# Patient Record
Sex: FEMALE | Race: WHITE | URBAN - METROPOLITAN AREA
[De-identification: names, ages, dates, MRNs, and addresses within clinical notes are randomized per-mention and may not be internally consistent; named-entity substitution may affect disease eponyms.]

---

## 2019-11-22 ENCOUNTER — OFFICE VISIT (OUTPATIENT)
Dept: URGENT CARE | Age: 79
End: 2019-11-22

## 2019-11-22 VITALS
RESPIRATION RATE: 16 BRPM | HEART RATE: 88 BPM | OXYGEN SATURATION: 97 % | SYSTOLIC BLOOD PRESSURE: 133 MMHG | TEMPERATURE: 98 F | DIASTOLIC BLOOD PRESSURE: 73 MMHG

## 2019-11-22 DIAGNOSIS — N39.0 URINARY TRACT INFECTION WITHOUT HEMATURIA, SITE UNSPECIFIED: ICD-10-CM

## 2019-11-22 DIAGNOSIS — R30.0 DYSURIA: Primary | ICD-10-CM

## 2019-11-22 LAB
APPEARANCE, POC: NORMAL
BILIRUBIN, POC: NEGATIVE
COLOR, POC: YELLOW
GLUCOSE UR-MCNC: NEGATIVE MG/DL
KETONES, POC: NEGATIVE
NITRITE, POC: NEGATIVE
OCCULT BLOOD, POC: NORMAL
PH UR: 7 [PH] (ref 5–7)
PROT UR-MCNC: NORMAL G/DL
SP GR UR: 1.02 (ref 1–1.03)
UROBILINOGEN UR-MCNC: 0.2 MG/DL (ref 0–1)
WBC (LEUKOCYTE) ESTERASE, POC: NORMAL

## 2019-11-22 PROCEDURE — 99203 OFFICE O/P NEW LOW 30 MIN: CPT | Performed by: PHYSICIAN ASSISTANT

## 2019-11-22 PROCEDURE — 81003 URINALYSIS AUTO W/O SCOPE: CPT | Performed by: PHYSICIAN ASSISTANT

## 2019-11-22 RX ORDER — SULFAMETHOXAZOLE AND TRIMETHOPRIM 800; 160 MG/1; MG/1
1 TABLET ORAL 2 TIMES DAILY
Qty: 20 TABLET | Refills: 0 | Status: SHIPPED | OUTPATIENT
Start: 2019-11-22 | End: 2019-12-02

## 2019-11-24 LAB
BACTERIA UR CULT: ABNORMAL
ORGANISM: ABNORMAL
REPORT STATUS (RPT): ABNORMAL
SPECIMEN SOURCE: ABNORMAL

## 2019-11-25 ENCOUNTER — TELEPHONE (OUTPATIENT)
Dept: URGENT CARE | Age: 79
End: 2019-11-25

## 2023-12-01 ENCOUNTER — APPOINTMENT (OUTPATIENT)
Dept: URBAN - METROPOLITAN AREA CLINIC 195 | Age: 83
Setting detail: DERMATOLOGY
End: 2023-12-04

## 2023-12-01 VITALS
HEIGHT: 61 IN | SYSTOLIC BLOOD PRESSURE: 156 MMHG | WEIGHT: 125 LBS | TEMPERATURE: 98 F | DIASTOLIC BLOOD PRESSURE: 78 MMHG | RESPIRATION RATE: 16 BRPM | HEART RATE: 78 BPM

## 2023-12-01 DIAGNOSIS — Z12.83 ENCOUNTER FOR SCREENING FOR MALIGNANT NEOPLASM OF SKIN: ICD-10-CM

## 2023-12-01 DIAGNOSIS — L57.8 OTHER SKIN CHANGES DUE TO CHRONIC EXPOSURE TO NONIONIZING RADIATION: ICD-10-CM

## 2023-12-01 PROBLEM — D04.39 CARCINOMA IN SITU OF SKIN OF OTHER PARTS OF FACE: Status: ACTIVE | Noted: 2023-12-01

## 2023-12-01 PROBLEM — C44.329 SQUAMOUS CELL CARCINOMA OF SKIN OF OTHER PARTS OF FACE: Status: ACTIVE | Noted: 2023-12-01

## 2023-12-01 PROCEDURE — OTHER COUNSELING: OTHER

## 2023-12-01 PROCEDURE — 17282 DSTR MAL LS F/E/E/N/L/M1.1-2: CPT | Mod: 59

## 2023-12-01 PROCEDURE — OTHER SURGICAL DECISION MAKING: OTHER

## 2023-12-01 PROCEDURE — OTHER MOHS SURGERY: OTHER

## 2023-12-01 PROCEDURE — 13132 CMPLX RPR F/C/C/M/N/AX/G/H/F: CPT | Mod: 59

## 2023-12-01 PROCEDURE — OTHER DESTRUCTION WITH CO2 LASER: OTHER

## 2023-12-01 PROCEDURE — 99203 OFFICE O/P NEW LOW 30 MIN: CPT | Mod: 25

## 2023-12-01 PROCEDURE — OTHER MALIGNANT DESTRUCTION WITH CO2 LASER: OTHER

## 2023-12-01 PROCEDURE — 17311 MOHS 1 STAGE H/N/HF/G: CPT

## 2023-12-01 PROCEDURE — OTHER MIPS QUALITY: OTHER

## 2023-12-01 NOTE — PROCEDURE: DESTRUCTION WITH CO2 LASER
Body Location Override (Optional - Billing Will Still Be Based On Selected Body Map Location If Applicable): right medial cheek
Detail Level: Detailed
Size Of Lesion In Cm (Required): 2
X Size Of Lesion In Cm (Optional): 0
Was Feathering Performed?: No
Anesthesia Type: 1% lidocaine with 1:100,000 epinephrine and 408mcg clindamycin/ml and a 1:10 solution of 8.4% sodium bicarbonate
External Cooling Fan Speed: 5
Laser Type: CO2 laser
Post-Care Statement: Following the procedure, vaseline and a bandage were applied.
Consent: Written consent obtained, risks reviewed including but not limited to crusting, scabbing, blistering, scarring, darker or lighter pigmentary change, incomplete improvement, infection and bleeding.
Post-Care Instructions: I reviewed with the patient in detail post-care instructions. Patient should avoid sun until the area is fully healed. Pt should apply vaseline to treated areas.
Bill As A Line Item Or As Units: Line Item

## 2023-12-01 NOTE — PROCEDURE: MOHS SURGERY
Body Location Override (Optional - Billing Will Still Be Based On Selected Body Map Location If Applicable): right medial cheek
Referred To Mid-Level For Closure Text (Leave Blank If You Do Not Want): After obtaining clear surgical margins the patient was sent to a mid-level provider for surgical repair.  The patient understands they will receive post-surgical care and follow-up from the mid-level provider.
Bcc Superficial Histology Text: There were aggregates of basaloid cells budding from the epidermis.
Plastic Surgeon Procedure Text (B): After obtaining clear surgical margins the patient was sent to plastics for surgical repair.  The patient understands they will receive post-surgical care and follow-up from the referring physician's office.
Epidermal Closure: running
Bilateral Helical Rim Advancement Flap Text: Because of the tightness of the surrounding skin, the full-thickness nature of the defect with exposed cartilage, and to avoid deformity, a helical rim advancement flap was planed.  After prep and anesthesia, an incision was made in the anterior portion of the ear through the skin and the cartilage to the posterior perichondrium both superiorly and inferiorly within the scapha of the ear.  Inferiorly, this extended to the lobule where a Burow’s triangle was excised anteriorly.  The chondrocutaneous flaps were then  from the ear posteriorly at the level of the perichondrium to the postauricular sulcus.  A small rim of cartilage was also excised around the contour of the ear both superiorly and inferiorly, and after hemostasis obtained, the chondrocutaneous flaps were advanced and closed in a layered fashion
O-L Flap Text: Because of the full-thickness nature of the defect, and to preserve nearby structures and landmarks, a Burow’s advancement flap (L-plasty) was planned. After prep and anesthesia, a Burow's triangle was excised adjacent to the defect.  Perpendicular to this, a line was incised and crescentic Burow’s triangle excised.  The flap was elevated by undermining in the subcutaneous plane. Hemostasis was obtained.  The flap was advanced into the defect with care to avoid distortion and was sutured into place in a layered fashion
Quadrants Reporting?: 0
Render Postcare In The Note: No
Show Mohs Case Number Variable: Yes
Estlander Flap (Upper To Lower Lip) Text: The defect of the lower lip was assessed and measured.  Given the location and size of the defect, an Estlander flap was deemed most appropriate.  Using a sterile surgical marker, an appropriate Estlander flap was measured and drawn on the upper lip. Local anesthesia was then infiltrated. A scalpel was then used to incise the lateral aspect of the flap, through skin, muscle and mucosa, leaving the flap pedicled medially.  The flap was then rotated and positioned to fill the lower lip defect.  The flap was then sutured into place with a three layer technique, closing the orbicularis oris muscle layer with subcutaneous buried sutures, followed by a mucosal layer and an epidermal layer.
Consent (Ear)/Introductory Paragraph: The rationale for Mohs was explained to the patient and consent was obtained. The risks, benefits and alternatives to therapy were discussed in detail. Specifically, the risks of ear deformity, infection, scarring, bleeding, prolonged wound healing, incomplete removal, allergy to anesthesia, nerve injury and recurrence were addressed. Prior to the procedure, the treatment site was clearly identified and confirmed by the patient. All components of Universal Protocol/PAUSE Rule completed.
Special Stains Stage 6 - Results: Base On Clearance Noted Above
Consent (Near Eyelid Margin)/Introductory Paragraph: The rationale for Mohs was explained to the patient and consent was obtained. The risks, benefits and alternatives to therapy were discussed in detail. Specifically, the risks of ectropion or eyelid deformity, infection, scarring, bleeding, prolonged wound healing, incomplete removal, allergy to anesthesia, nerve injury and recurrence were addressed. Prior to the procedure, the treatment site was clearly identified and confirmed by the patient. All components of Universal Protocol/PAUSE Rule completed.
Asc Procedure Text (C): After obtaining clear surgical margins the patient was sent to an ASC for surgical repair.  The patient understands they will receive post-surgical care and follow-up from the ASC physician.
Bcc Pigmented Histology Text: There were aggregates of basaloid cells.
Cheiloplasty (Less Than 50%) Text: In order to maintain form and function of the lip, and to avoid distortion of the free margin, a lip advancement flap was planned. After rep and local anesthesia, Burow’s triangles were excised superiorly to the nasal sill and inferiorly around the lip to the labial mucosa.  Two flaps were elevated by undermining the skin laterally in both directions,  the skin and mucosa from the orbicularis muscle.  Any redundant orbicularis oris muscle was removed and complimentary edges of remaining muscle reapposed with a horizontal mattress stitch.  After hemostasis was obtained, the flaps were advanced and closed in a layered fashion.
Acral Melanoma Histology Text: Proliferation of MART-1+ cells.
Full Thickness Lip Wedge Repair (Flap) Text: In order to maintain form and function of the lip, and to avoid distortion of the free margin, a lip advancement flap was planned. After prep and local anesthesia, Burow’s triangles were excised superiorly to the nasal sill and inferiorly around the lip to the labial mucosa.  Two flaps were elevated by undermining the skin laterally in both directions,  the skin and mucosa from the orbicularis muscle.  Any redundant orbicularis oris muscle was removed and complimentary edges of remaining muscle reapposed with a horizontal mattress stitch.  After hemostasis was obtained, the flaps were advanced and closed in a layered fashion.
Peng Advancement Flap Text: Because of the full-thickness nature of the defect and location adjacent to free margin of alar rims, and to maintain functional airway and alar rim position, a bilateral advancement flap was planned. After prep and anesthesia, incisions were extended from the opposite side of the wound along the alar grooves, and Burow’s triangles at the end of each incision were excised.  Two flaps were created by undermining in the subcutaneous plane skeletonizing the nasal cartilages. After hemostasis was obtained, the flaps were advanced and sutured in a layered fashion.
Unique Flap 2 Text: Because of the full-thickness nature of the defect and to reduce risk for alar rim retraction, and after discussion of options and risk for alar retraction with this repair, a free cartilage graft was planned.  An incision through the anterior antihelix was made and the skin lifted in the periochondrial plane.  A square of cartilage sized to fit defect.
Unique Flap 1 Name: Nasal Advancement Flap
Hemigard Intro: Due to skin fragility and wound tension, it was decided to use HEMIGARD adhesive retention suture devices to permit a linear closure. The skin was cleaned and dried for a 6cm distance away from the wound. Excessive hair, if present, was removed to allow for adhesion.
Purse String (Simple) Text: In order to control the direction of wound closure, to prevent distortion from wound contraction, and to reduce wound size to facilitate wound care and healing, an intermediate repair was planned.  After prep and anesthesia, and circumferential undermining, subcutaneous and dermal stitches were carefully placed across the wound.
Localized Dermabrasion With Wire Brush Text: First, a scalpel was used to shave remove protuberant scar and sebaceous hyperplasia.  Next, sterilized sandpaper was used to physically abrade to papillary dermis. This spot dermabrasion was performed to complete skin cancer reconstruction. It also will eliminate the other sun damaged precancerous cells that are known to be part of the regional effect of a lifetime's worth of sun exposure. This localized dermabrasion is therapeutic and should not be considered cosmetic in any regard.
Dressing: pressure dressing with telfa
Provider Procedure Text (C): After obtaining clear surgical margins the defect was repaired by another provider.
Epidermal Autograft Text: Because of the location and depth of the defect and to facilitate healing, an epidermal autograft was deemed most appropriate.  The epidermal-dermal pinch grafts were then harvested.  The skin grafts were then placed in the primary defect and oriented appropriately. The grafts were secured with vaseline gauze and bandage.
Double O-Z Flap Text: Because of the full-thickness nature of the defect and location adjacent to important structure(s), a bilateral rotation flap (O to T) was planned. After prep and anesthesia, arcuate incisions were extended from two opposite side of the wound.  Two flaps were created by undermining in the subcutaneous plane. After hemostasis was obtained, the flaps were advanced and sutured in a layered fashion.
Oculoplastic Surgeon Procedure Text (E): After obtaining clear surgical margins the patient was sent to oculoplastics for surgical repair.  The patient understands they will receive post-surgical care and follow-up from the referring physician's office.
Island Pedicle Flap Text: In order to avoid distortion of nearby structures, an island pedicle flap was planned.  After prep and local anesthesia, a triangular incision was made.  The flap was dissected to a muscular subcutaneous pedicle.  The skin surrounding the flap was undermined to allow for closure of the donor-site defect. After hemostasis obtained, the flap was advanced into place and sutured in a layered fashion.
Stage 10: Additional Anesthesia Type: 1% lidocaine with 1:100,000 epinephrine and 408mcg clindamycin/ml and a 1:10 solution of 8.4% sodium bicarbonate
Metatypical Bcc Histology Text: There were aggregates of basaloid cells in a metatypical pattern.
Alternatives Discussed Intro (Do Not Add Period): I discussed alternative treatments to Mohs surgery and specifically discussed the risks and benefits of
Island Pedicle Flap-Requiring Vessel Identification Text: Due to the location on free margin and to restore and maintain airway, an alar IPF was planned.  Given the location of the defect, shape of the defect and the proximity to free margins an island pedicle advancement flap was deemed most appropriate.  Using a sterile surgical marker, an appropriate advancement flap was drawn, based on the axial vessel mentioned above, incorporating the defect, outlining the appropriate donor tissue and placing the expected incisions within the relaxed skin tension lines where possible.    The area thus outlined was incised deep to adipose tissue with a #15 scalpel blade.  The skin margins were undermined to an appropriate distance in all directions around the primary defect and laterally outward around the island pedicle utilizing iris scissors.  There was minimal undermining beneath the pedicle flap.
Consent (Marginal Mandibular)/Introductory Paragraph: The rationale for Mohs was explained to the patient and consent was obtained. The risks, benefits and alternatives to therapy were discussed in detail. Specifically, the risks of damage to the marginal mandibular branch of the facial nerve, infection, scarring, bleeding, prolonged wound healing, incomplete removal, allergy to anesthesia, and recurrence were addressed. Prior to the procedure, the treatment site was clearly identified and confirmed by the patient. All components of Universal Protocol/PAUSE Rule completed.
Bcc  Morpheaform/Sclerosing Histology Text: There were aggregates of basaloid cells demonstrating a morpheaform/sclerosing pattern.
Anesthesia Volume In Cc: 2
Cheek-To-Nose Interpolation Flap Text: In order to maintain the form and function of the airway, and to maintain the alar groove, a two-stage interpolation axial flap and staged reconstruction was planned for closure.  A telfa template was made of the defect.  This was then used to outline the cheek interpolation flap.  The donor area for the pedicle flap was then anesthetized.  The flap was incised through the skin and subcutaneous tissue down to the layer of the underlying musculature.  The flap was carefully incised within the deep plane to maintain its blood supply. The pedicle was then rotated into position and sutured.  \\n\\nTo close the donor-site defect on the cheek, an advancement flap was created by wide undermining laterally in the subcutaneous plane. After hemostasis was obtained, this flap was advanced medially and sutured in a layered fashion.  The portion of the advancement flap near the pedicle of the interpolation flap was closed with care, so as not to constrict the vasculature of the pedicle.   \\n\\nOnce the flaps were sutured into place, adequate blood supply was confirmed with blanching and refill.  The pedicle was wrapped with xeroform gauze and dressed with telfa and gauze bandage to ensure continued blood supply and protect the attached pedicle.
Incidental Squamous Cell Carcinoma In Situ Histology Text: Incidental SCIS was noted at the periphery of the Mohs section and additional stages or destruction were performed until clear.
Dorsal Nasal Flap Text: Because of the full-thickness nature of the defect and to avoid deformity of free margin of the ala, and after full discussion of the spectrum of repair options, a dorsal nasal rotation flap was planned.  After prep and anesthesia, a Burow’s triangle was excised from the lateral portion superiorly on the nasal sidewall towards the inner canthus and an incision extended from the inferior margin of the wound across the nose and sidewall, then extended superiorly along the nasofacial sulcus and medial cheek through the inner canthus to the glabella where a back cut was made to the contralateral inner canthus.  The flap was elevated by skeletonizing the nasal root, dorsum, and sidewalls.  After hemostasis obtained, the flap was rotated into place, trimmed to fit the defect, and sutured in a layered fashion.
Non-Graft Cartilage Fenestration Text: The exposed cartilage was fenestrated with a 3mm punch biopsy to help facilitate wound healing, and decrease infection and chondritis.
Lazy S Complex Repair Preamble Text (Leave Blank If You Do Not Want): Extensive wide undermining was performed.
Mustarde Flap Text: Because of full-thickness of the defect and to avoid distortion of the lower eyelid and canthus, a Mustarde cheek rotation flap was planned. After prep and anesthesia, a large Burow’s triangle was excised inferiorly.  An incision was made from the superior portion of the wound over the orbital rim, curving upward onto the temple, then down toward the preauricular crease where another Burow’s triangle was excised.  The full cheek flap was elevated and the wound edges were undermined in the subcutaneous plane. After hemostasis, the flap was rotated into place with care to preserve lower lid position, trimmed at the tip, suspended with a periosteal stitch from the orbital rim, and sutured in a a layered fashion.
Consent (Lip)/Introductory Paragraph: The rationale for Mohs was explained to the patient and consent was obtained. The risks, benefits and alternatives to therapy were discussed in detail. Specifically, the risks of lip deformity, changes in the oral aperture, infection, scarring, bleeding, prolonged wound healing, incomplete removal, allergy to anesthesia, nerve injury and recurrence were addressed. Prior to the procedure, the treatment site was clearly identified and confirmed by the patient. All components of Universal Protocol/PAUSE Rule completed.
Stage 13: Additional Anesthesia Type: 1% lidocaine with epinephrine
Zygomaticofacial Flap Text: Because of full-thickness of the defect and to avoid distortion of the lower eyelid, a full cheek rotation flap was planned. After prep and anesthesia, a large Burow’s triangle was excised inferiorly.  An incision was made from the superior portion of the wound over the orbital rim, curving upward onto the temple, then downward along the preauricular crease and below the ear lobule where another Burow’s triangle was excised.  The full cheek flap was elevated and the wound edges were undermined in the subcutaneous plane. After hemostasis, the flap was rotated into place, trimmed at the tip, suspended with a periosteal stitch from the orbital rim, and sutured in a a layered fashion.
Nasal Turnover Hinge Flap Text: Due to the deep nature of the defect, and to restore structure and function, and to cover and ensure survival of underlying tissue, and to provide cutaneous coverage, a cutaneous hinge flap was performed.  Three sides of the full-thickness skin adjacent to the defect were incised and flipped over like a page of the book into the defect, and secured with Vicryl stitches.
Consent 2/Introductory Paragraph: Mohs surgery was explained to the patient and consent was obtained. The risks, benefits and alternatives to therapy were discussed in detail. Specifically, the risks of infection, scarring, bleeding, prolonged wound healing, incomplete removal, allergy to anesthesia, nerve injury and recurrence were addressed. Prior to the procedure, the treatment site was clearly identified and confirmed by the patient. All components of Universal Protocol/PAUSE Rule completed.
Perineural Invasion (For Histology - Be Specific If Possible): absent
Secondary Intention Text (Leave Blank If You Do Not Want): Due to the superficial nature of the defect and/or patient preference, the wound was allowed to heal by secondary intention.
Post-Care Instructions: I reviewed with the patient in detail post-care instructions. Patient is not to engage in any heavy lifting, exercise, or swimming for the next 14 days. Should the patient develop any fevers, chills, bleeding, severe pain patient will contact the office immediately.
Closure 3 Information: This tab is for additional flaps and grafts above and beyond our usual structured repairs.  Please note if you enter information here it will not currently bill and you will need to add the billing information manually.
Abbe Flap (Upper To Lower Lip) Text: The defect of the lower lip was assessed and measured.  Given the location and size of the defect, an Abbe flap was deemed most appropriate.  Using a sterile surgical marker, an appropriate Abbe flap was measured and drawn on the upper lip. Local anesthesia was then infiltrated.  A scalpel was then used to incise the upper lip through and through the skin, vermilion, muscle and mucosa, leaving the flap pedicled on the opposite side.  The flap was then rotated and transferred to the lower lip defect.  The flap was then sutured into place with a three layer technique, closing the orbicularis oris muscle layer with subcutaneous buried sutures, followed by a mucosal layer and an epidermal layer.
Was The Patient On Physician Recommended Anticoagulation Therapy?: Please Select the Appropriate Response
Vermilion Border Text: The closure involved the vermilion border.
O-T Advancement Flap Text: Because of the full-thickness nature of the defect and location adjacent to important structure(s), a bilateral advancement flap (A to T) was planned. After prep and anesthesia, a Burow's triangle was excised adjacent to the defect.  Incisions were extended from the opposite side of the wound, and smaller Burow’s triangles at the end of each incision.  Two flaps were created by undermining in the subcutaneous plane. After hemostasis was obtained, the flaps were advanced and sutured in a layered fashion.
Consent 1/Introductory Paragraph: The rationale for Mohs was explained to the patient and consent was obtained. The risks, benefits and alternatives to therapy were discussed in detail. Specifically, the risks of infection, scarring, bleeding, prolonged wound healing, incomplete removal, allergy to anesthesia, nerve injury and recurrence were addressed. Prior to the procedure, the treatment site was clearly identified and confirmed by the patient. All components of Universal Protocol/PAUSE Rule completed.
Muscle Hinge Flap Text: Due to the deep nature of the defect, and to ensure survival of the overlying flap or graft repair for cutaneous coverage, the wound was first addressed with a muscle hinge flap.  Three sides of the muscle were incised and flipped over like a page of the book into the defect, and secured with Vicryl stitches.
M-Plasty Intermediate Repair Preamble Text (Leave Blank If You Do Not Want): Undermining was performed with blunt dissection.
Abbe Flap (Lower To Upper Lip) Text: The defect of the upper lip was assessed and measured.  Given the location and size of the defect, an Abbe flap was deemed most appropriate.  Using a sterile surgical marker, an appropriate Abbe flap was measured and drawn on the lower lip. Local anesthesia was then infiltrated. A scalpel was then used to incise the upper lip through and through the skin, vermilion, muscle and mucosa, leaving the flap pedicled on the opposite side.  The flap was then rotated and transferred to the lower lip defect.  The flap was then sutured into place with a three layer technique, closing the orbicularis oris muscle layer with subcutaneous buried sutures, followed by a mucosal layer and an epidermal layer.
Scc Sarcomatoid Histology Text: There were aggregates of squamous cells in a sarcomatous pattern.
Mart-1 - Positive Histology Text: MART-1 staining demonstrates areas of higher density and clustering of melanocytes with Pagetoid spread upwards within the epidermis. The surgical margins are positive for tumor cells.
O-T Plasty Text: Because of the full-thickness nature of the defect and location adjacent to important structure(s), a bilateral advancement flap (O to T) was planned. After prep and anesthesia, a Burow's triangle was excised adjacent to the defect.  Incisions were extended from the opposite side of the wound, and smaller Burow’s triangles at the end of each incision.  Two flaps were created by undermining in the subcutaneous plane. After hemostasis was obtained, the flaps were advanced and sutured in a layered fashion.
Rhombic Flap Text: Because of the size and full-thickness nature of the defect, and in order to maintain form and function while avoiding distortion of the nearby nasal tip and ala, a rhombic transposition flap was planned. After prep and anesthesia, a Burow's triangle was excised laterally, just superior to the alar groove. The rhombic flap was carefully incised superior to the defect.  The flap was raised and the wound edges were all undermined in the subcutaneous plane. After hemostasis, the flap was transposed into the defect and sutured in a layered fashion.
Area H Indication Text: Tumors in this location are included in Area H (eyelids, eyebrows, nose, lips, chin, ear, pre-auricular, post-auricular, temple, genitalia, hands, feet, ankles and areola).  Tissue conservation is critical in these anatomic locations.
Consent (Scalp)/Introductory Paragraph: The rationale for Mohs was explained to the patient and consent was obtained. The risks, benefits and alternatives to therapy were discussed in detail. Specifically, the risks of changes in hair growth pattern secondary to repair, infection, scarring, bleeding, prolonged wound healing, incomplete removal, allergy to anesthesia, nerve injury and recurrence were addressed. Prior to the procedure, the treatment site was clearly identified and confirmed by the patient. All components of Universal Protocol/PAUSE Rule completed.
Scc Histology Text: There were aggregates of squamous cells.
Intermediate Repair And Graft Additional Text (Will Appearing After The Standard Complex Repair Text): The intermediate repair was not sufficient to completely close the primary defect. The remaining additional defect was repaired with the graft mentioned below.
Number Of Hemigard Strips Per Side: 1
Scc In Situ Histology Text: There was squamous cell atypia and proliferation in a SCIS pattern.
Otolaryngologist Procedure Text (D): After obtaining clear surgical margins the patient was sent to otolaryngology for surgical repair.  The patient understands they will receive post-surgical care and follow-up from the referring physician's office.
Rotation Flap Text: Because of the full-thickness nature of the defect and proximity to vital structures, a rotation flap was planned. After prep and local anesthesia, the flap was carefully incised along an arc.  A Burow's triangle was removed adjacent to the defect and along the outside of the arc. The flap was raised and the wound edges were undermined in the subcutaneous plane. After hemostasis, the flap was rotated into the defect. The distal tip of the flap was trimmed to fit the defect. The entirety of the flap's arcuate border was sutured in a layered fashion
Split-Thickness Skin Graft Text: Because of the size and thickness of the defect, and to provide coverage and facilitate healing with minimal contraction, a split-thickness skin graft was planned.  The donor site was marked and the graft harvested by scalpel, Weck blade, or dermatome.  The graft was then trimmed to fit the defect.  It was sutured into place and a bolster dressing applied.
Suturegard Body: The suture ends were repeatedly re-tightened and re-clamped to achieve the desired tissue expansion.
Bcc Keratotic Histology Text: There were aggregates of basaloid cells demonstrating a pink keratotic pattern.
Wound Care (No Sutures): Petrolatum
Bilobed Transposition Flap Text: Because of the orientation and full-thickness nature of the defect, and in order to avoid distortion of the surrounding structures, a bilobed flap was planned.  After prep and local anesthesia, the flap was then outlined, incised and elevated.  The skin was widely undermined to allow for closure of the donor site defect.  After hemostasis obtained, the flaps were transposed into place and sutured in a layered fashion.
Fibroepithelioma Of Pinkus Histology Text: There were aggregates of basaloid cells consistent with fibroepithelioma of pinks.
Brow Lift Text: A midfrontal incision was made medially to the defect to allow access to the tissues just superior to the left eyebrow. Following careful dissection inferiorly in a supraperiosteal plane to the level of the left eyebrow, several 3-0 monocryl sutures were used to resuspend the eyebrow orbicularis oculi muscular unit to the superior frontal bone periosteum. This resulted in an appropriate reapproximation of static eyebrow symmetry and correction of the left brow ptosis.
Undermining Type: Entire Wound
Ftsg Text: Because of the full-thickness nature of the defect, to protect underlying structures, and to avoid distortion, a full-thickness skin graft was planned. After prep and local anesthesia, a template was made of the defect and the graft was harvested.  The graft was defatted and trimmed to fit the defect. It was sutured into place circumferentially and a tie-over Bolster dressing was applied.  The donor site was converted to a fusiform defect, and after hemostasis, was closed in a layered fashion.
Advancement-Rotation Flap Text: In order to maintain form and function of the airway, a spiral rotation flap was planned.  After prep and local anesthesia, an incision was made from the inferior, tangentially parallel to the alar rim, and then extended superiorly across the alar crease, vertically on the nasal sidewall, and laterally toward the cheek, or onto the cheek with a backcut.  The flap was undermined and after hemostasis was obtained, the flap was rotated down into place.  All wound edges were sutured in a layered fashion.
Chonodrocutaneous Helical Advancement Flap Text: Because of the tightness of the surrounding skin, the full-thickness nature of the defect with exposed cartilage, and to avoid deformity, a helical rim advancement flap was planed.  After prep and anesthesia, an incision was made in the anterior portion of the ear through the skin and the cartilage to the posterior perichondrium both superiorly and inferiorly within the scapha of the ear.  Inferiorly, this extended to the lobule where a Burow’s triangle was excised anteriorly.  The chondrocutaneous flaps were then  from the ear posteriorly at the level of the perichondrium to the postauricular sulcus.  A small rim of cartilage was also excised around the contour of the ear and after hemostasis obtained, the chondrocutaneous flaps were advanced and closed in a layered fashion
Scc Ka Subtype Histology Text: There were aggregates of glassy squamous cells consistent with keratoacanthoma.
Donor Site Anesthesia Type: same as repair anesthesia
Unique Flap 2 Name: Free Cartilage graft
No Repair - Repaired With Adjacent Surgical Defect Text (Leave Blank If You Do Not Want): After obtaining clear surgical margins the defect was repaired concurrently with another surgical defect which was in close approximation.
Ear Star Wedge Flap Text: Because of the tightness of the surrounding skin, the full-thickness nature of the defect with exposed cartilage, and to avoid deformity, a star wedge advancement flap was planed.  After prep and anesthesia, a full-thickness triangular wedge of tissue was excised, as well as two brows triangles on either side of this to reduce cupping deformity. The chondrocutaneous flaps were then advanced and closed in a layered fashion.
Bcc Infundibulocystic Histology Text: There were aggregates of basaloid cells demonstrating an infundibulocystic pattern.
Area M Indication Text: Tumors in this location are included in Area M (cheek, forehead, scalp, neck, jawline and pretibial skin).  Mohs surgery is indicated for tumors in these anatomic locations.
Consent Type: Consent 1 (Standard)
Keystone Flap Text: Given the location of the defect, the surrounding tension, to facilitate healing, and the shape of the defect, a keystone flap was planned.  Using a sterile surgical marker, an appropriate keystone flap was drawn incorporating the defect, outlining the appropriate donor tissue and placing the expected incisions within the relaxed skin tension lines where possible. The area thus outlined was incised deep to adipose tissue with a #15 scalpel blade.  The skin margins were undermined to an appropriate distance in all directions around the primary defect and laterally outward around the flap utilizing iris scissors.  The wound edges were sutured in a layered fashion.
Mercedes Flap Text: Because of the full-thickness nature of the defect and tightness of surrounding skin, a triple-advancement flap was planned.  After prep and local anesthesia, three Burow’s triangles were excised adjacent to the defect.  The wound edges were widely undermined to raise three flaps in the deep subcutaneous plane.  Hemostasis was achieved.  The flaps were then advanced into the defect and sutured into place.
Referred To Plastics For Closure Text (Leave Blank If You Do Not Want): After obtaining clear surgical margins the patient was sent to plastics for surgical repair.  The patient understands they will receive post-surgical care and follow-up from the repairing physician's office.
Inflammation Suggestive Of Cancer Camouflage Histology Text: There was a dense lymphocytic infiltrate which prevented adequate histologic evaluation of adjacent structures.
Mohs Case Number: [id number]
Scc Acantholytic Histology Text: There were aggregates of squamous cells in an acantholytic pattern.
Area L Indication Text: Tumors in this location are included in Area L (trunk and extremities).  Mohs surgery is indicated for larger tumors, or tumors with aggressive histologic features, in these anatomic locations.
Mixed Nodular And Micronodular Bcc Histology Text: There were aggregates of basaloid cells in a nodular and micro nodular pattern.
Mauc Instructions: By selecting yes to the question below the MAUC number will be added into the note.  This will be calculated automatically based on the diagnosis chosen, the size entered, the body zone selected (H,M,L) and the specific indications you chose. You will also have the option to override the Mohs AUC if you disagree with the automatically calculated number and this option is found in the Case Summary tab.
Nostril Rim Text: The closure involved the nostril rim.
Composite Graft Text: In order to decrease risk for distortion of the alar rim, a full-thickness skin graft with cartilage graft was planned. After prep and local anesthesia, a template was made of the defect and the graft was harvested from the conchal bowl. After the graft was harvested, a strip of exposed cartilage was also elevated from the conchal bowl.  The cartilage was either inset into pockets on either side of the defect, or minced and placed at the base of the wound.  The skin graft was then defatted and trimmed to fit the defect. It was sutured into place circumferentially and a tie-over Bolster dressing was applied.  Hemostasis was obtained at the donor site which was then bandaged to heal by second intention.
Scc Poorly Differentiated Histology Text: There were aggregates of poorly-differentiated squamous cells.
Mixed Nodular And Infiltrative Bcc Histology Text: There were aggregates of basaloid cells in a nodular and infiltrative pattern.
Mohs Method Verbiage: An incision following the standard Mohs approach was done and the specimen was harvested as a microscopic controlled layer.
Double Z Plasty Text: The lesion was extirpated to the level of the fat with a #15 scalpel blade. Given the location of the defect, shape of the defect and the proximity to free margins a double Z-plasty was deemed most appropriate for repair. Using a sterile surgical marker, the appropriate transposition arms of the double Z-plasty were drawn incorporating the defect and placing the expected incisions within the relaxed skin tension lines where possible. The area thus outlined was incised deep to adipose tissue with a #15 scalpel blade. The skin margins were undermined to an appropriate distance in all directions utilizing iris scissors. The opposing transposition arms were then transposed and carried over into place in opposite direction and anchored with interrupted buried subcutaneous sutures.
Complex Repair And Flap Additional Text (Will Appearing After The Standard Complex Repair Text): The complex repair was not sufficient to completely close the primary defect. The remaining additional defect was repaired with the flap mentioned below.
Hemigard Postcare Instructions: The HEMIGARD strips are to remain completely dry for at least 5-7 days.
Missing Epidermis Histology Text: Missing tissue was noted on frozen sections and additional slides were cut until present.  If no additional tissue containing epidermis was available, then an additional stage was excised.
Retention Suture Bite Size: 3 mm
Epidermal Sutures: 5-0 Fast Absorbing Gut
Consent (Nose)/Introductory Paragraph: The rationale for Mohs was explained to the patient and consent was obtained. The risks, benefits and alternatives to therapy were discussed in detail. Specifically, the risks of nasal deformity, changes in the flow of air through the nose, infection, scarring, bleeding, prolonged wound healing, incomplete removal, allergy to anesthesia, nerve injury and recurrence were addressed. Prior to the procedure, the treatment site was clearly identified and confirmed by the patient. All components of Universal Protocol/PAUSE Rule completed.
Home Suture Removal Text: Patient was provided instructions on removing sutures and will remove their sutures at home.  If they have any questions or difficulties they will call the office.
V-Y Flap Text: Because of the full-thickness nature of the wound and to preserve nearby structures, a V-toY Advancement flap was planned. After prep and local anesthesia, a Burow’s triangle was excised adjacent to the defect.  Opposite from this, two smaller Burow’s triangles were excised.  Three flaps were created by undermining in the deep subcutaneous plane. After hemostasis, the flaps were advanced and closed in a layered fashion.
Crescentic Advancement Flap Text: In order to maintain form and function of the airway, a crescentic advancement flap was planned.  After prep and local anesthesia, a Burow’s triangle was excised superior to the defect.  A tangential and curvilinear incision was made onto the medial cheek.  Here, a crescentic Burow’s triangle was excised.  The laterally-based flap was then raised by dissection in the deep subcutaneous plane and the remainder of the wound edges were also undermined.  After hemostasis, the flap was advanced into the defect with a periosteal suture at the base of the flap and the lateral nasal bone.  All wound edges were closed in a layered fashion.
Surgeon Performing Repair (Optional): Dr. Evan Philippe
Additional Anesthesia Volume In Cc: 6
Eye Protection Verbiage: Before proceeding with the stage, a plastic scleral shield was inserted. The globe was anesthetized with a few drops of 1% lidocaine with 1:100,000 epinephrine. Then, an appropriate sized scleral shield was chosen and coated with lacrilube ointment. The shield was gently inserted and left in place for the duration of each stage. After the stage was completed, the shield was gently removed.
Adjacent Tissue Transfer Text: Because of the full-thickness nature of the wound and in order to displace lines around important structures, a Burow’s advancement flap was planned. After prep and local anesthesia, a Burow’s triangle was excised adjacent to the defect.  The other Burow's triangle was displaced to hide incisions within skin relaxation lines. Two flaps were created by undermining in the deep subcutaneous plane. After hemostasis, the flaps were advanced and closed in a layered fashion.
Staging Info: By selecting yes to the question above you will include information on AJCC 8 tumor staging in your Mohs note. Information on tumor staging will be automatically added for SCCs on the head and neck. AJCC 8 includes tumor size, tumor depth, perineural involvement and bone invasion.
Complex Repair And Graft Additional Text (Will Appearing After The Standard Complex Repair Text): The complex repair was not sufficient to completely close the primary defect. The remaining additional defect was repaired with the graft mentioned below.
Unique Flap 1 Text: Because of the full-thickness nature of the defect, and to maintain form and function of the nose, and the proximity to the nasal tip and ala, a bilateral advancement flap was carefully planned. After anesthesia and prep, a Burow's triangle was excised above the defect up to the nasal root, and a Burow’s triangle displaced centrally and excised below the defect down to the columella.  Two laterally-based flaps were created by undermining at the level of the periosteum and perichondrium skeletonizing the nasal tip, dorsum and sidewalls.  After hemostasis, a tension-reduction stitch was placed at the level of the distal nasal bone, and the flaps were sutured together in a layered fashion.
Referred To Oculoplastics For Closure Text (Leave Blank If You Do Not Want): After obtaining clear surgical margins the patient was sent to oculoplastics for surgical repair.  The patient understands they will receive post-surgical care and follow-up from the repairing physician's office.
Repair Anesthesia Method: local infiltration
Repair Hemostasis (Optional): Electrodesiccation
Repair Type: Complex Repair
Intermediate Repair And Flap Additional Text (Will Appearing After The Standard Complex Repair Text): The intermediate repair was not sufficient to completely close the primary defect. The remaining additional defect was repaired with the flap mentioned below.
H Plasty Text: Given the location of the defect, shape of the defect and the proximity to free margins a H-plasty was deemed most appropriate for repair.  Using a sterile surgical marker, the appropriate advancement arms of the H-plasty were drawn incorporating the defect and placing the expected incisions within the relaxed skin tension lines where possible. The area thus outlined was incised deep to adipose tissue with a #15 scalpel blade. The skin margins were undermined to an appropriate distance in all directions utilizing iris scissors.  The opposing advancement arms were then advanced into place in opposite direction and anchored with interrupted buried subcutaneous sutures.
Primary Defect Width In Cm (Final Defect Size - Required For Flaps/Grafts): 1.2
Bilateral Rotation Flap Text: The defect edges were debeveled with a #15 scalpel blade. Given the location of the defect, shape of the defect and the proximity to free margins a bilateral rotation flap was deemed most appropriate. Using a sterile surgical marker, an appropriate rotation flap was drawn incorporating the defect and placing the expected incisions within the relaxed skin tension lines where possible. The area thus outlined was incised deep to adipose tissue with a #15 scalpel blade. The skin margins were undermined to an appropriate distance in all directions utilizing iris scissors. Following this, the designed flap was carried over into the primary defect and sutured into place.
Scc Moderately Differentiated Histology Text: There were aggregates of moderately-differentiated squamous cells.
Suturegard Intro: Intraoperative tissue expansion was performed, utilizing the SUTUREGARD device, in order to reduce wound tension.
Same Histology In Subsequent Stages Text: The pattern and morphology of the tumor is as described in the first stage.
Deep Sutures: 5-0 Vicryl
No Residual Tumor Seen Histology Text: There were no malignant cells seen in the sections examined.
Alar Island Pedicle Flap Text: The defect edges were debeveled with a #15 scalpel blade.  Given the location of the defect, shape of the defect and the proximity to the alar rim an island pedicle advancement flap was deemed most appropriate.  Using a sterile surgical marker, an appropriate advancement flap was drawn incorporating the defect, outlining the appropriate donor tissue and placing the expected incisions within the nasal ala running parallel to the alar rim. The area thus outlined was incised with a #15 scalpel blade.  The skin margins were undermined minimally to an appropriate distance in all directions around the primary defect and laterally outward around the island pedicle utilizing iris scissors.  There was minimal undermining beneath the pedicle flap.
Mart-1 - Negative Histology Text: MART-1 staining demonstrates a normal density and pattern of melanocytes along the dermal-epidermal junction. The surgical margins are negative for tumor cells.
Simple / Intermediate / Complex Repair - Final Wound Length In Cm: 3.4
Double O-Z Plasty Text: Because of the full-thickness nature of the defect and location adjacent to important structure(s), a bilateral rotation flap (double O to Z) was planned. After prep and anesthesia, arcuate incisions were extended from two opposite side of the wound.  Two flaps were created by undermining in the subcutaneous plane. After hemostasis was obtained, the flaps were advanced and sutured in a layered fashion.
Skin Substitute Text: Because of the size and depth of the defect and to facilitate healing by granulation, a skin substitute graft was planned.  After prep and local anesthesia, Xenograft material was trimmed to fi the defect and secured circumferentially.
Detail Level: Detailed
O-Z Plasty Text: Because of the full-thickness nature of the defect and location adjacent to important structure(s), a bilateral rotation flap (O to Z) was planned. After prep and anesthesia, arcuate incisions were extended from two opposite side of the wound.  Two flaps were created by undermining in the subcutaneous plane. After hemostasis was obtained, the flaps were advanced and sutured in a layered fashion.
Unique Flap 3 Text: Because of the through-and-through defect of the lateral ala, in order to restore the nose and maintain an open airway, a island-pedicle Spear flap was planned with cheek advancement flap.  After prep and anesthesia, a template was made of the right side of the lip and transferred to the left side to outline the normal anatomic position of the triangular portion of the upper lip.  A template was then made of the lining and outer defect of the left ala and transferred to the medial cheek adjacent to the nasolabial fold.  An island-pedicle flap was incised and elevated and carefully dissected to a muscular subcutaneous pedicle based near the piriform aperture and ascending portion of the maxilla.  This was carefully turned over and sutured to line the ala, then turned up on itself where it was sutured in a layered fashion.  \\n\\nTo close the donor site defect, a cheek flap was elevated by undermining in the subcutaneous plane lateral to the lateral canthus  After hemostasis was obtained the flap was advanced medially, attached to the piriform aperture of the axilla and ascending portion of the maxilla, and then closed in a layered fashion.  The advancement flap measured 3 x 4 cm.
Bcc Adenoid Histology Text: There were aggregates of basaloid cells demonstrating an adenoid or cribiform pattern.
Pain Refusal Text: I offered to prescribe pain medication but the patient refused to take this medication.
Transposition Flap Text: Because of orientation and full-thickness nature of the defect, a rhombic transposition flap was planned. After prep and anesthesia, the rhombic flap was carefully incised to straddle relaxed skin tension lines and the anticipated Burow's triangle removed. The flap was raised and the wound edges were all undermined in the subcutaneous plane. After hemostasis, the flap was transposed into the defect and sutured in a layered fashion.
Bcc Infiltrative Histology Text: There were aggregates of basaloid cells demonstrating an infiltrative pattern.
Initial Size Of Lesion: 1.8
Scc Well Differentiated Histology Text: There were aggregates of well-differentiated squamous cells.
Tarsorrhaphy Text: A tarsorrhaphy was performed using Frost sutures.
Bcc Micronodular Histology Text: There were aggregates of basaloid cells demonstrating a micro nodular pattern.
Ear Wedge Repair Text: Due to exposed cartilage and to restore structure and function of the ear, a wedge excision was completed by carrying down an excision through the full thickness of the ear and cartilage with an inward facing Burow's triangle. The wound was then closed in a layered fashion.
Medical Necessity Statement: Based on my medical judgement, Mohs surgery is the most appropriate treatment for this cancer compared to other treatments.
Hemigard Retention Suture: 2-0 Nylon
Incidental Actinic Keratosis Histology Text: Incidental AK was noted at the periphery of the Mohs section destruction was performed, pt was was advised to monitor, and/or patient was advised to considered topical 5FU once fully healed.
Surgeon/Pathologist Verbiage (Will Incorporate Name Of Surgeon From Intro If Not Blank): operated in two distinct and integrated capacities as the surgeon and pathologist.
Helical Rim Text: The closure involved the helical rim.
Stage 1 Add-On Histology Text: SCIS seen on margin; treated with CO2 laser refer to map
Follicular Atypia Histology Text: Follicular atypia was noted and reviewed with patient, patient was advised to monitor and report any skin changes.
Dermal Autograft Text: The defect edges were debeveled with a #15 scalpel blade.  Given the location of the defect, shape of the defect and the proximity to free margins a dermal autograft was deemed most appropriate.  Using a sterile surgical marker, the primary defect shape was transferred to the donor site. The area thus outlined was incised deep to adipose tissue with a #15 scalpel blade.  The harvested graft was then trimmed of adipose and epidermal tissue until only dermis was left.  The skin graft was then placed in the primary defect and oriented appropriately.
Posterior Auricular Interpolation Flap Text: Due to location on free margin and exposed cartilage and to restore structure and function, a decision was made to reconstruct the defect utilizing an interpolation axial flap and a staged reconstruction.  A telfa template was made of the defect.  This telfa template was then used to outline the posterior auricular interpolation flap.  The donor area for the pedicle flap was then injected with anesthesia.  The flap was excised through the skin and subcutaneous tissue down to the layer of the underlying musculature.  The pedicle flap was carefully excised within this deep plane to maintain its blood supply.  The edges of the donor site were undermined.   The donor site was closed in a primary fashion.  The pedicle was then rotated into position and sutured.  Once the tube was sutured into place, adequate blood supply was confirmed with blanching and refill.  The pedicle was then wrapped with xeroform gauze and dressed appropriately with a telfa and gauze bandage to ensure continued blood supply and protect the attached pedicle.
Information: Selecting Yes will display possible errors in your note based on the variables you have selected. This validation is only offered as a suggestion for you. PLEASE NOTE THAT THE VALIDATION TEXT WILL BE REMOVED WHEN YOU FINALIZE YOUR NOTE. IF YOU WANT TO FAX A PRELIMINARY NOTE YOU WILL NEED TO TOGGLE THIS TO 'NO' IF YOU DO NOT WANT IT IN YOUR FAXED NOTE.
Dfsp Histology Text: There were densely arranged spindle-shaped cells.
Trichoepithelioma Histology Text: There were aggregates of densely blue cells.
Bilobed Flap Text: Because of the size and full-thickness nature of the defect, and to maintain form and function of the nose, and to avoid distortion of the nearby nasal tip and ala, a bilobed transposition flap was planned. After prep and local anesthesia, the bilobe was carefully incised with a Burow's triangle oriented superiorly. The flap was raised and the wound edges were undermined in the submuscular plane to the nasofacial junction. After hemostasis, the flaps were transposed into the defect and sutured in a layered fashion
Closure 2 Information: This tab is for additional flaps and grafts, including complex repair and grafts and complex repair and flaps. You can also specify a different location for the additional defect, if the location is the same you do not need to select a new one. We will insert the automated text for the repair you select below just as we do for solitary flaps and grafts. Please note that at this time if you select a location with a different insurance zone you will need to override the ICD10 and CPT if appropriate.
Area Suspicious For Tumor Histology Text: An area suspicious for additional tumor was noted and excised with additional stage(s).
Scc Spindle Histology Text: There were aggregates of spindle-like squamous cells.
Staged Advancement Flap Text: Because of the full-thickness nature of the wound and in order to displace lines around important structures, a Burow’s advancement flap was planned. After prep and local anesthesia, a Burow’s triangle was excised adjacent to the defect.  The other Burow's triangle was displaced to hide incisions within skin relaxation lines. Two flaps were created by undermining in the deep subcutaneous plane. After hemostasis, the flaps were advanced and closed in a layered fashion. This is a staged repair and the patient will return for additional surgery.
Helical Rim Advancement Flap Text: Because of the tightness of the surrounding skin, the full-thickness nature of the defect with exposed cartilage, and to avoid deformity, a helical rim advancement flap was planned.  After prep and anesthesia, any protuberant cartilage or cartilage in the way of tissue movement and approximation was excised.  Then, an incision was made in the anterior portion of the ear through the skin to the posterior ear at the level of the perichondrium both superiorly and inferiorly.  Inferiorly, this extended to the lobule where a Burow’s triangle was excised anteriorly.  The flaps were then  from the ear posteriorly at the level of the perichondrium to the postauricular sulcus.  After hemostasis obtained, the flaps were advanced and closed in a layered fashion
Retention Suture Text: Retention sutures were placed to support the closure and prevent dehiscence.
Postop Diagnosis: same
Paramedian Forehead Flap Text: Because of the size and full-thickness nature of the defect, and to maintain form and function of the nose, a forehead flap with bilateral forehead advancement flap closure of the donor site was planned.  After prep and anesthesia, excisional preparation of the recipient site was performed by outlining the cosmetic unit of the nasal tip.  Hemostasis was obtained.  A template was then made of the defect and transferred with the appropriate length to the upper forehead.  The forehead flap was incised and elevated based on the supratrochlear neurovascular bundle.  This was carefully dissected over the orbital rim to the nasion.  It was distally thinned and transferred to the nasal tip.  This was sutured in a layered fashion.  To close the donor site defect on the forehead, a large Burow’s triangle was excised superiorly and posteriorly into the scalp, and two large flaps were elevated by undermining the entire anterior scalp and forehead to the temples bilaterally, and over the supraorbital rim inferiorly.  After hemostasis was obtained, these flaps were advanced and closed in a layered fashion.
Double Island Pedicle Flap Text: The defect edges were debeveled with a #15 scalpel blade.  Given the location of the defect, shape of the defect and the proximity to free margins a double island pedicle advancement flap was deemed most appropriate.  Using a sterile surgical marker, an appropriate advancement flap was drawn incorporating the defect, outlining the appropriate donor tissue and placing the expected incisions within the relaxed skin tension lines where possible.    The area thus outlined was incised deep to adipose tissue with a #15 scalpel blade.  The skin margins were undermined to an appropriate distance in all directions around the primary defect and laterally outward around the island pedicle utilizing iris scissors.  There was minimal undermining beneath the pedicle flap. The wound edges were sutured in a layered fashion.
Ck7 - Negative Histology Text: CK staining demonstrates a normal density and pattern.
Xenograft Text: Because of the size and depth of the defect and to facilitate healing by granulation, a tissue-cultured epidermal autograft was planned.  After prep and local anesthesia, Xenograft material was trimmed to fi the defect and secured
Debridement Text: The wound edges were debrided prior to proceeding with the closure to facilitate wound healing.
Scc Desmoplastic Subtype Histology Text: There were aggregates of squamous cells in a desk-plastic pattern.
Consent (Temporal Branch)/Introductory Paragraph: The rationale for Mohs was explained to the patient and consent was obtained. The risks, benefits and alternatives to therapy were discussed in detail. Specifically, the risks of damage to the temporal branch of the facial nerve, infection, scarring, bleeding, prolonged wound healing, incomplete removal, allergy to anesthesia, and recurrence were addressed. Prior to the procedure, the treatment site was clearly identified and confirmed by the patient. All components of Universal Protocol/PAUSE Rule completed.
Orbicularis Oris Muscle Flap Text: Due to the deep nature of the defect, location near free margin, and to restore oral sphincter function, an orbicularis oris muscle flap was planned.  Using a sterile surgical marker, an appropriate flap was drawn incorporating the defect. The area thus outlined was incised and advanced into place, re-establishing function and closing the wound, and secured with layered stitches.
Tumor Debulked?: scalpel
Star Wedge Flap Text: Because of the tightness of the surrounding skin, the full-thickness nature of the defect with exposed cartilage, and to avoid deformity, a star wedge advancement flap was planned.  After prep and anesthesia, a full-thickness triangular wedge of tissue was excised, as well as two Burow's triangles on either side of this to reduce cupping deformity. The chondrocutaneous flaps were then advanced and closed in a layered fashion.
Mucosal Advancement Flap Text: Because of the full-thickness nature of the wound and in order to restore and maintain function, a mucosal advancement flap was planned. After prep and local anesthesia, Burow’s triangles were excised adjacent to the defect.  Two flaps were created by undermining in the deep subcutaneous plane over the orbicularis oris. After hemostasis, the flaps were advanced and closed in a layered fashion, with care to maintain vermillion border and oral commissures.
Melolabial Transposition Flap Text: In order to maintain form and function of the airway, and to avoid distortion, a nasolabial transposition flap with cheek advancement flap closure at the donor site was planned. After prep and local anesthesia, a Burow's triangle was excised superiorly toward the inner canthus.  An incision was made inferiorly in the nasolabial fold to the lateral commissure of the mouth, then extended superiorly on the medial cheek where a nasolabial flap was elevated by undermining the skin in the subcutaneous plane of the cheek.  The primary defect on the nose was also undermined. The flap was distally thinned, transposed into place, amputated at the tip to fit the defect, and sutured to the nose defect in a layered fashion.  See above for size of this flap.  \\n\\nTo close the donor-site defect on the cheek, a cheek advancement flap was elevated by undermining the skin of the cheek in the subcutaneous plane laterally beyond the lateral canthus and superiorly above the orbital rim. After hemostasis was obtained, the flap was advanced medially and attached with peristeal stitches to the pyriform aperture of the maxilla and to the ascending portion of the maxilla. Remaining wound edges were closed in a layered fashion.  This cheek advancement flap measured 3 x 4 cm.
Incidental Superficial Basal Cell Carcinoma Histology Text: Incidental superficial BCC was noted at the periphery of the Mohs section and additional stages were performed until clear.
Manual Repair Warning Statement: We plan on removing the manually selected variable below in favor of our much easier automatic structured text blocks found in the previous tab. We decided to do this to help make the flow better and give you the full power of structured data. Manual selection is never going to be ideal in our platform and I would encourage you to avoid using manual selection from this point on, especially since I will be sunsetting this feature. It is important that you do one of two things with the customized text below. First, you can save all of the text in a word file so you can have it for future reference. Second, transfer the text to the appropriate area in the Library tab. Lastly, if there is a flap or graft type which we do not have you need to let us know right away so I can add it in before the variable is hidden. No need to panic, we plan to give you roughly 6 months to make the change.
Trilobed Flap Text: Because of the size and full-thickness nature of the defect, and to maintain form and function of the nose, and to avoid distortion of the nearby nasal tip and ala, a trilobed transposition flap was planned. After prep and local anesthesia, the trilobe was carefully incised with a Burow's triangle oriented superiorly. The flap was raised and the wound edges were undermined in the submuscular plane to the nasofacial junction. After hemostasis, the flaps were transposed into the defect and sutured in a layered fashion
Desmoplastic Trichoepithelioma Histology Text: There were basaloid cell proliferation in an infiltrative sclerosing pattern.
Primary Defect Length In Cm (Final Defect Size - Required For Flaps/Grafts): 2.2
Length To Time In Minutes Device Was In Place: 10
W Plasty Text: The lesion was extirpated to the level of the fat with a #15 scalpel blade.  Given the location of the defect, shape of the defect and the proximity to free margins a W-plasty was deemed most appropriate for repair.  Using a sterile surgical marker, the appropriate transposition arms of the W-plasty were drawn incorporating the defect and placing the expected incisions within the relaxed skin tension lines where possible.    The area thus outlined was incised deep to adipose tissue with a #15 scalpel blade.  The skin margins were undermined to an appropriate distance in all directions utilizing iris scissors.  The opposing transposition arms were then transposed into place in opposite direction and anchored with interrupted buried subcutaneous sutures.
Island Pedicle Flap With Canthal Suspension Text: In order to avoid distortion of nearby structures, an island pedicle flap was planned.  After prep and local anesthesia, a triangular incision was made.  The flap was dissected to a muscular subcutaneous pedicle.  The skin surrounding the flap was undermined to allow for closure of the donor-site defect. After hemostasis obtained, the flap was advanced into place and sutured in a layered fashion.   suspension suture was placed in the canthal tendon to prevent tension and prevent ectropion.
Z Plasty Text: In order to reduce appearance and discomfort related to the web, a Z-plasty was designed.  Aftert prep and anesthesia, a horizontal incision was made across the web.  A double transposition flap was incised in a Z-plasty fashion.  The wound margins were widely undermined to elevate two flaps of skin.  After hemostasis was obtained, the flaps were transposed into place, and sutured in a a layered fashion.
Spiral Flap Text: In order to maintain form and function of the airway, a spiral rotation flap was planned.  After prep and local anesthesia, an incision was made from the inferior wound edge, tangentially parallel to the alar rim, and then extended superiorly across the alar crease, vertically on the nasal sidewall, and laterally toward the cheek, or onto the cheek with a backcut.  The flap was undermined and after hemostasis was obtained, the flap was rotated down into place.  All wound edges were sutured in a layered fashion.
Consent (Spinal Accessory)/Introductory Paragraph: The rationale for Mohs was explained to the patient and consent was obtained. The risks, benefits and alternatives to therapy were discussed in detail. Specifically, the risks of damage to the spinal accessory nerve, infection, scarring, bleeding, prolonged wound healing, incomplete removal, allergy to anesthesia, and recurrence were addressed. Prior to the procedure, the treatment site was clearly identified and confirmed by the patient. All components of Universal Protocol/PAUSE Rule completed.
Where Do You Want The Question To Include Opioid Counseling Located?: Case Summary Tab
Mohs Histo Method Verbiage: Each section was then chromacoded and processed in the Mohs lab using the Mohs protocol and submitted for frozen section.
Tissue Cultured Epidermal Autograft Text: Because of the size and depth of the defect and to facilitate healing by granulation, a tissue-cultured epidermal autograft was planned.  After prep and local anesthesia, Xenograft material was trimmed to fi the defect and secured circumferentially.
Referred To Otolaryngology For Closure Text (Leave Blank If You Do Not Want): After obtaining clear surgical margins the patient was sent to otolaryngology for surgical repair.  The patient understands they will receive post-surgical care and follow-up from the repairing physician's office.
Sox10 - Negative Histology Text: SOX10 staining demonstrates a normal density and pattern of melanocytes along the dermal-epidermal junction. The surgical margins are negative for tumor cells.
Bi-Rhombic Flap Text: Because of orientation and full-thickness nature of the defect, a rhombic transposition flap was planned. After  prep and anesthesia, two rhombic flaps were carefully incised to straddle relaxed skin tension lines and the anticipated Burow's triangle were removed. The flap was raised and the wound edges were all undermined in the subcutaneous plane. After hemostasis, the flaps were transposed into the defect and sutured in a layered fashion.
Unique Flap 3 Name: Spear Flap
Scc In Situ With Follicular Extension Histology Text: There was squamous cell atypia and proliferation in a SCIS pattern, with follicular or adnexal extension.
Tumor Depth: Less than 6mm from granular layer and no invasion beyond the subcutaneous fat
Pinch Graft Text: The defect edges were debeveled with a #15 scalpel blade. Given the location of the defect, shape of the defect and the proximity to free margins a pinch graft was deemed most appropriate. Using a sterile surgical marker, the primary defect shape was transferred to the donor site. The area thus outlined was incised deep to adipose tissue with a #15 scalpel blade.  The harvested graft was then trimmed of adipose tissue until only dermis and epidermis was left. The skin margins of the secondary defect were undermined to an appropriate distance in all directions utilizing iris scissors.  The secondary defect was closed with interrupted buried subcutaneous sutures.  The skin edges were then re-apposed with running  sutures.  The skin graft was then placed in the primary defect and oriented appropriately.
Banner Transposition Flap Text: Because of orientation and full-thickness nature of the defect, a long rhombic transposition flap (banner flap) was planned. After prep and anesthesia, the rhombic flap was carefully incised to straddle relaxed skin tension lines and the anticipated Burow's triangle removed. The flap was raised and the wound edges were all undermined in the subcutaneous plane. After hemostasis, the flap was transposed into the defect and sutured in a layered fashion.
Mixed Superficial And Nodular Bcc Histology Text: There were aggregates of basaloid cells in a superficial and nodular pattern.
Cartilage Graft Text: Because of the laxity of the alar rim resulting from loss of fibrofatty support, and to preserve form and function of the nose, a cartilage graft was planned.  An incision was made into the conchal bowl and a cutaneous flap was elevated. The conchal bowl cartilage was excised and after hemostasis was obtained, the cutaneous flap was replaced and sutured down.  The cartilage graft was then carved to fit the defect.  Two pockets were made medially and laterally in the alar rim, and the cartilage strut was sutured into place with Vicryl suture.
Subsequent Stages Histo Method Verbiage: Using a similar technique to that described above, a thin layer of tissue was removed from all areas where tumor was visible on the previous stage.  The tissue was again oriented, mapped, dyed, and processed as above.
Consent 3/Introductory Paragraph: I gave the patient a chance to ask questions they had about the procedure.  Following this I explained the Mohs procedure and consent was obtained. The risks, benefits and alternatives to therapy were discussed in detail. Specifically, the risks of infection, scarring, bleeding, prolonged wound healing, incomplete removal, allergy to anesthesia, nerve injury and recurrence were addressed. Prior to the procedure, the treatment site was clearly identified and confirmed by the patient. All components of Universal Protocol/PAUSE Rule completed.
Estimated Blood Loss (Cc): minimal
Mohs Rapid Report Verbiage: The area of clinically evident tumor was marked with skin marking ink and appropriately hatched.  The initial incision was made following the Mohs approach through the skin.  The specimen was taken to the lab, divided into the necessary number of pieces, chromacoded and processed according to the Mohs protocol.  This was repeated in successive stages until a tumor free defect was achieved.
Burow's Graft Text: Because of the full-thickness nature of the wound and surrounding skin tension, a complex linear repair with Burow's graft was planned. After prep and anesthesia, one or two Burow’s triangles were excised adjacent to the defect and placed into sterile saline.  Two flaps were raised bilaterally by undermining widely in the subcutaneous plane. Hemostasis was obtained and the flaps were advanced into the defect with care to avoid distortion, and the wound edges were closed in a layered fashion, leaving a smaller defect.  A Burow’s triangle was defatted and trimmed to fit this remaining defect, and sutured into place circumferentially.
Mastoid Interpolation Flap Text: Due to the full-thickness nature of the wound and to restore structure/function, a decision was made to reconstruct the defect utilizing an interpolation axial flap and a staged reconstruction.  A telfa template was made of the defect.  This telfa template was then used to outline the mastoid interpolation flap.  The donor area for the pedicle flap was then injected with anesthesia.  The flap was excised through the skin and subcutaneous tissue down to the layer of the underlying musculature.  The pedicle flap was carefully excised within this deep plane to maintain its blood supply.  The edges of the donor site were undermined.   The donor site was closed in a primary fashion.  The pedicle was then rotated into position and sutured.  Once the tube was sutured into place, adequate blood supply was confirmed with blanching and refill.  The pedicle was then wrapped with xeroform gauze and dressed appropriately with a telfa and gauze bandage to ensure continued blood supply and protect the attached pedicle.
Location Indication Override (Is Already Calculated Based On Selected Body Location): Area M

## 2023-12-01 NOTE — PROCEDURE: MALIGNANT DESTRUCTION WITH CO2 LASER
8/20/2018 9:08 AM 
 
Ms. Stephanie Brower [address] [address] Dear Ms. Humberto Coley missed you! Please call our office at [phone] and schedule a follow up appointment for your continued care. Sincerely, Jony Lira MD 

Body Location Override (Optional - Billing Will Still Be Based On Selected Body Map Location If Applicable): right medial cheek
Detail Level: Detailed
Size Of Lesion In Cm (Required): 2
X Size Of Lesion In Cm (Optional): 0
Was Feathering Performed?: No
Anesthesia Type: 1% lidocaine with 1:100,000 epinephrine and 408mcg clindamycin/ml and a 1:10 solution of 8.4% sodium bicarbonate
External Cooling Fan Speed: 5
Laser Type: CO2 laser
Post-Care Statement: Following the procedure, vaseline and a bandage were applied.
Consent: Written consent obtained, risks reviewed including but not limited to crusting, scabbing, blistering, scarring, darker or lighter pigmentary change, incomplete improvement, infection and bleeding.
Post-Care Instructions: I reviewed with the patient in detail post-care instructions. Patient should avoid sun until the area is fully healed. Pt should apply vaseline to treated areas.
Bill As A Line Item Or As Units: Line Item